# Patient Record
Sex: MALE | Race: WHITE | NOT HISPANIC OR LATINO | URBAN - METROPOLITAN AREA
[De-identification: names, ages, dates, MRNs, and addresses within clinical notes are randomized per-mention and may not be internally consistent; named-entity substitution may affect disease eponyms.]

---

## 2022-02-23 ENCOUNTER — OFFICE VISIT (OUTPATIENT)
Dept: URBAN - METROPOLITAN AREA CLINIC 107 | Facility: CLINIC | Age: 42
End: 2022-02-23
Payer: COMMERCIAL

## 2022-02-23 ENCOUNTER — WEB ENCOUNTER (OUTPATIENT)
Dept: URBAN - METROPOLITAN AREA CLINIC 107 | Facility: CLINIC | Age: 42
End: 2022-02-23

## 2022-02-23 VITALS
BODY MASS INDEX: 27.83 KG/M2 | RESPIRATION RATE: 18 BRPM | HEART RATE: 78 BPM | SYSTOLIC BLOOD PRESSURE: 125 MMHG | WEIGHT: 210 LBS | HEIGHT: 73 IN | TEMPERATURE: 87.6 F | DIASTOLIC BLOOD PRESSURE: 79 MMHG

## 2022-02-23 DIAGNOSIS — R10.13 EPIGASTRIC ABDOMINAL PAIN: ICD-10-CM

## 2022-02-23 DIAGNOSIS — R13.14 PHARYNGOESOPHAGEAL DYSPHAGIA: ICD-10-CM

## 2022-02-23 PROCEDURE — 99204 OFFICE O/P NEW MOD 45 MIN: CPT | Performed by: NURSE PRACTITIONER

## 2022-02-23 RX ORDER — ATORVASTATIN CALCIUM 10 MG/1
1 TABLET TABLET, FILM COATED ORAL ONCE A DAY
Status: ACTIVE | COMMUNITY

## 2022-02-23 RX ORDER — UBIDECARENONE 100 MG
AS DIRECTED CAPSULE ORAL
Status: ACTIVE | COMMUNITY

## 2022-02-23 RX ORDER — PANTOPRAZOLE SODIUM 40 MG/1
1 TABLET TABLET, DELAYED RELEASE ORAL ONCE A DAY
Status: ACTIVE | COMMUNITY

## 2022-02-23 NOTE — HPI-TODAY'S VISIT:
41-year-old male presenting for evaluation of dysphagia. Abdominal ultrasound 2/23/2021:Suspected anteriorly angulated/directed xiphoid process; noncontrast CT recommended for confirmation.  Otherwise no acute process. Within the last 18 months, he has experienced an intermittent knot-like discomfort in the epigastrium.  He had an ultrasound per his PCP which was fairly unremarkable, outlined below.  He saw another gastroenterologist and was started on Protonix and plan for an upper endoscopy, but he never scheduled the procedure.  His symptoms have persisted.  He states he is often awakening with dull epigastric pain and bloating which has been worsening over the last few weeks, following dietary indiscretion while watching the Super Bowl.  The symptoms are worsened following meals.  He denies heartburn or regurgitation on pantoprazole, but is only taking the medication intermittently.  He complains of a dry mouth and esophagus which leads to significant coughing.  This will subside with water or dry candy.  For several years, he has experienced intermittent difficulty swallowing.  He indicates certain foods such as meat or bread will become hung between the mid chest and level of the xiphoid process.  This is painful and can be passed with a sip of water or raising his arms above his head.

## 2022-03-21 ENCOUNTER — CLAIMS CREATED FROM THE CLAIM WINDOW (OUTPATIENT)
Dept: URBAN - METROPOLITAN AREA SURGERY CENTER 25 | Facility: SURGERY CENTER | Age: 42
End: 2022-03-21
Payer: COMMERCIAL

## 2022-03-21 ENCOUNTER — CLAIMS CREATED FROM THE CLAIM WINDOW (OUTPATIENT)
Dept: URBAN - METROPOLITAN AREA CLINIC 4 | Facility: CLINIC | Age: 42
End: 2022-03-21
Payer: COMMERCIAL

## 2022-03-21 ENCOUNTER — OFFICE VISIT (OUTPATIENT)
Dept: URBAN - METROPOLITAN AREA SURGERY CENTER 25 | Facility: SURGERY CENTER | Age: 42
End: 2022-03-21

## 2022-03-21 DIAGNOSIS — K21.9 GASTROESOPHAGEAL REFLUX DISEASE: ICD-10-CM

## 2022-03-21 DIAGNOSIS — K31.89 REACTIVE GASTROPATHY: ICD-10-CM

## 2022-03-21 DIAGNOSIS — K22.2 ESOPHAGEAL STENOSIS: ICD-10-CM

## 2022-03-21 DIAGNOSIS — K20.0 EOSINOPHILIC ESOPHAGITIS: ICD-10-CM

## 2022-03-21 DIAGNOSIS — K29.70 GASTRITIS, UNSPECIFIED, WITHOUT BLEEDING: ICD-10-CM

## 2022-03-21 PROCEDURE — G8907 PT DOC NO EVENTS ON DISCHARG: HCPCS | Performed by: INTERNAL MEDICINE

## 2022-03-21 PROCEDURE — 88312 SPECIAL STAINS GROUP 1: CPT | Performed by: PATHOLOGY

## 2022-03-21 PROCEDURE — 43248 EGD GUIDE WIRE INSERTION: CPT | Performed by: INTERNAL MEDICINE

## 2022-03-21 PROCEDURE — 43239 EGD BIOPSY SINGLE/MULTIPLE: CPT | Performed by: INTERNAL MEDICINE

## 2022-03-21 PROCEDURE — 88305 TISSUE EXAM BY PATHOLOGIST: CPT | Performed by: PATHOLOGY

## 2022-03-21 RX ORDER — PANTOPRAZOLE SODIUM 40 MG/1
1 TABLET TABLET, DELAYED RELEASE ORAL ONCE A DAY
Status: ACTIVE | COMMUNITY

## 2022-03-21 RX ORDER — ATORVASTATIN CALCIUM 10 MG/1
1 TABLET TABLET, FILM COATED ORAL ONCE A DAY
Status: ACTIVE | COMMUNITY

## 2022-03-21 RX ORDER — UBIDECARENONE 100 MG
AS DIRECTED CAPSULE ORAL
Status: ACTIVE | COMMUNITY

## 2022-04-21 ENCOUNTER — OFFICE VISIT (OUTPATIENT)
Dept: URBAN - METROPOLITAN AREA CLINIC 107 | Facility: CLINIC | Age: 42
End: 2022-04-21
Payer: COMMERCIAL

## 2022-04-21 VITALS
HEIGHT: 73 IN | SYSTOLIC BLOOD PRESSURE: 103 MMHG | DIASTOLIC BLOOD PRESSURE: 78 MMHG | TEMPERATURE: 98.2 F | HEART RATE: 83 BPM | BODY MASS INDEX: 28.1 KG/M2 | RESPIRATION RATE: 18 BRPM | WEIGHT: 212 LBS

## 2022-04-21 DIAGNOSIS — R10.13 EPIGASTRIC ABDOMINAL PAIN: ICD-10-CM

## 2022-04-21 DIAGNOSIS — R13.14 PHARYNGOESOPHAGEAL DYSPHAGIA: ICD-10-CM

## 2022-04-21 DIAGNOSIS — K20.0 EOSINOPHILIC ESOPHAGITIS: ICD-10-CM

## 2022-04-21 PROCEDURE — 99214 OFFICE O/P EST MOD 30 MIN: CPT | Performed by: PHYSICIAN ASSISTANT

## 2022-04-21 RX ORDER — UBIDECARENONE 100 MG
AS DIRECTED CAPSULE ORAL
Status: ACTIVE | COMMUNITY

## 2022-04-21 RX ORDER — PANTOPRAZOLE SODIUM 40 MG/1
1 TABLET TABLET, DELAYED RELEASE ORAL TWICE A DAY
Qty: 180 TABLET | Refills: 4 | OUTPATIENT
Start: 2022-05-04

## 2022-04-21 RX ORDER — PANTOPRAZOLE SODIUM 40 MG/1
1 TABLET TABLET, DELAYED RELEASE ORAL ONCE A DAY
Status: ACTIVE | COMMUNITY

## 2022-04-21 RX ORDER — ATORVASTATIN CALCIUM 10 MG/1
1 TABLET TABLET, FILM COATED ORAL ONCE A DAY
Status: ACTIVE | COMMUNITY

## 2022-04-21 NOTE — HPI-TODAY'S VISIT:
Mr. Chan is a 41-year-old gentleman with a history of hyperlipidemia, presenting for follow-up after undergoing EGD. He was last seen on 2/23/2022 for intermittent epigastric abdominal pain, GERD and progressively worsening solid food dysphagia.  He was to resume pantoprazole daily.  An EGD was planned at that time. EGD (3/21/2022): Z-line irregular with possible tongue of Broussard's esophagus.  Benign-appearing esophageal stenosis.  Dilated with 17 mm Savary dilator.  3 cm hiatal hernia.  Free-flow gastroesophageal reflux visualized during procedure.  Nonerosive gastropathy.  Normal duodenum.  Pathology revealed changes consistent with chemical/reactive gastropathy on stomach antrum and body biopsies.  GE junction biopsy revealed squamous columnar mucosa with markedly increased intraepithelial eosinophils (80 per hpf), arising in a background of reflux type changes.  No evidence of infectious organisms or Broussard's esophagus.  Negative for dysplasia or malignancy   He reports improvements in his swallowing after undergoing EGD.  Unfortunately, he continues to report complaints of epigastric abdominal discomfort.  He describes a "knot" in his stomach that "becomes more pronounced with food".  He is compliant with pantoprazole 40 mg every morning with minimal improvement in his symptoms.  He also has undergone dietary modifications with slight improvements.  He denies any difficulty swallowing or regurgitation.  No nausea or vomiting.  He apparently had an abdominal ultrasound performed 2 to 4 months ago.  Unfortunately do not have this result for review.  He denies any unintentional weight loss, fevers or chills. He denies any changes in his bowel habits.  No red blood per rectum, melena, hematochezia, constipation or diarrhea.

## 2022-05-04 PROBLEM — 40739000 DYSPHAGIA: Status: ACTIVE | Noted: 2022-02-23

## 2022-07-27 ENCOUNTER — OFFICE VISIT (OUTPATIENT)
Dept: URBAN - METROPOLITAN AREA CLINIC 107 | Facility: CLINIC | Age: 42
End: 2022-07-27
Payer: COMMERCIAL

## 2022-07-27 ENCOUNTER — DASHBOARD ENCOUNTERS (OUTPATIENT)
Age: 42
End: 2022-07-27

## 2022-07-27 VITALS
DIASTOLIC BLOOD PRESSURE: 76 MMHG | HEIGHT: 73 IN | WEIGHT: 209.2 LBS | HEART RATE: 69 BPM | BODY MASS INDEX: 27.73 KG/M2 | TEMPERATURE: 98.3 F | SYSTOLIC BLOOD PRESSURE: 127 MMHG

## 2022-07-27 DIAGNOSIS — K20.0 EOSINOPHILIC ESOPHAGITIS: ICD-10-CM

## 2022-07-27 DIAGNOSIS — R10.13 EPIGASTRIC ABDOMINAL PAIN: ICD-10-CM

## 2022-07-27 DIAGNOSIS — K21.00 GASTROESOPHAGEAL REFLUX DISEASE WITH ESOPHAGITIS WITHOUT HEMORRHAGE: ICD-10-CM

## 2022-07-27 PROCEDURE — 99213 OFFICE O/P EST LOW 20 MIN: CPT | Performed by: INTERNAL MEDICINE

## 2022-07-27 RX ORDER — UBIDECARENONE 100 MG
AS DIRECTED CAPSULE ORAL
Status: ACTIVE | COMMUNITY

## 2022-07-27 RX ORDER — PANTOPRAZOLE SODIUM 40 MG/1
1 TABLET TABLET, DELAYED RELEASE ORAL ONCE A DAY
OUTPATIENT

## 2022-07-27 RX ORDER — ATORVASTATIN CALCIUM 10 MG/1
1 TABLET TABLET, FILM COATED ORAL ONCE A DAY
Status: ACTIVE | COMMUNITY

## 2022-07-27 RX ORDER — PANTOPRAZOLE SODIUM 40 MG/1
1 TABLET TABLET, DELAYED RELEASE ORAL ONCE A DAY
Status: ACTIVE | COMMUNITY

## 2022-07-27 NOTE — HPI-TODAY'S VISIT:
Mr. Chan is a 41-year-old gentleman with GERD and dysphagia presenting for follow up.  He was last seen on 4/21/2022 for follow-up after undergoing an upper endoscopy for the evaluation of GERD, epigastric abdominal pain and esophageal dysphagia.  Biopsies of the esophagus were notable for eosinophilic esophagitis.  He was recommended to take pantoprazole 40 mg daily.  He returns for follow-up reporting that he is overall doing well.  He occasionally experiences a "knot" in his epigastrium that waxes and wanes, has no modifying factors including meals.  He denies any dysphagia.  He also denies any nausea, vomiting or abdominal pain.  Bowel habits are regular normal consistency.  No melena or red blood per rectum.

## 2022-07-27 NOTE — HPI-OTHER HISTORIES
EGD (3/21/2022): Z-line irregular with possible tongue of Broussard's esophagus.  Benign-appearing esophageal stenosis.  Dilated with 17 mm Savary dilator.  3 cm hiatal hernia.  Free-flow gastroesophageal reflux visualized during procedure.  Nonerosive gastropathy.  Normal duodenum.  Pathology revealed changes consistent with chemical/reactive gastropathy on stomach antrum and body biopsies.  GE junction biopsy revealed squamous columnar mucosa with markedly increased intraepithelial eosinophils (80 per hpf), arising in a background of reflux type changes.  No evidence of infectious organisms or Broussard's esophagus.  Negative for dysplasia or malignancy

## 2022-08-11 PROBLEM — 79922009 EPIGASTRIC PAIN: Status: ACTIVE | Noted: 2022-05-04

## 2022-08-11 PROBLEM — 266433003: Status: ACTIVE | Noted: 2022-07-27

## 2022-08-11 PROBLEM — 235599003: Status: ACTIVE | Noted: 2022-05-04

## 2025-07-30 ENCOUNTER — LAB OUTSIDE AN ENCOUNTER (OUTPATIENT)
Dept: URBAN - METROPOLITAN AREA CLINIC 113 | Facility: CLINIC | Age: 45
End: 2025-07-30

## 2025-07-30 ENCOUNTER — TELEPHONE ENCOUNTER (OUTPATIENT)
Dept: URBAN - METROPOLITAN AREA CLINIC 113 | Facility: CLINIC | Age: 45
End: 2025-07-30

## 2025-07-30 ENCOUNTER — OFFICE VISIT (OUTPATIENT)
Dept: URBAN - METROPOLITAN AREA CLINIC 113 | Facility: CLINIC | Age: 45
End: 2025-07-30

## 2025-07-30 RX ORDER — UBIDECARENONE 100 MG
AS DIRECTED CAPSULE ORAL
Status: ACTIVE | COMMUNITY

## 2025-07-30 RX ORDER — ATORVASTATIN CALCIUM 10 MG/1
1 TABLET TABLET, FILM COATED ORAL ONCE A DAY
Status: ACTIVE | COMMUNITY

## 2025-07-30 RX ORDER — SODIUM, POTASSIUM,MAG SULFATES 17.5-3.13G
354 ML SOLUTION, RECONSTITUTED, ORAL ORAL DAILY
Qty: 354 ML | Refills: 0 | OUTPATIENT
Start: 2025-07-31 | End: 2025-08-01

## 2025-07-30 RX ORDER — PANTOPRAZOLE SODIUM 20 MG/1
1 TABLET TABLET, DELAYED RELEASE ORAL ONCE A DAY
Status: ACTIVE | COMMUNITY

## 2025-08-02 ENCOUNTER — WEB ENCOUNTER (OUTPATIENT)
Age: 45
End: 2025-08-02

## 2025-08-02 RX ORDER — SODIUM, POTASSIUM,MAG SULFATES 17.5-3.13G
177 ML SOLUTION, RECONSTITUTED, ORAL ORAL AS DIRECTED
Qty: 354 MILLILITER | Refills: 0 | OUTPATIENT
Start: 2025-08-02

## 2025-08-02 RX ORDER — PANTOPRAZOLE SODIUM 40 MG/1
1 TABLET 1/2 TO 1 HOUR BEFORE MORNING MEAL TABLET, DELAYED RELEASE ORAL DAILY
Qty: 30 | Refills: 2

## 2025-08-04 ENCOUNTER — TELEPHONE ENCOUNTER (OUTPATIENT)
Dept: URBAN - METROPOLITAN AREA SURGERY CENTER 25 | Facility: SURGERY CENTER | Age: 45
End: 2025-08-04

## 2025-08-06 ENCOUNTER — OFFICE VISIT (OUTPATIENT)
Dept: URBAN - METROPOLITAN AREA SURGERY CENTER 25 | Facility: SURGERY CENTER | Age: 45
End: 2025-08-06
Payer: COMMERCIAL

## 2025-08-06 ENCOUNTER — CLAIMS CREATED FROM THE CLAIM WINDOW (OUTPATIENT)
Dept: URBAN - METROPOLITAN AREA CLINIC 4 | Facility: CLINIC | Age: 45
End: 2025-08-06
Payer: COMMERCIAL

## 2025-08-06 DIAGNOSIS — K51.218 CHRONIC ULCERATIVE PROCTITIS WITH OTHER COMPLICATION: ICD-10-CM

## 2025-08-06 DIAGNOSIS — K62.89 OTHER SPECIFIED DISEASES OF ANUS AND RECTUM: ICD-10-CM

## 2025-08-06 DIAGNOSIS — R19.5 OCCULT BLOOD POSITIVE STOOL: ICD-10-CM

## 2025-08-06 DIAGNOSIS — Z12.11 COLON CANCER SCREENING: ICD-10-CM

## 2025-08-06 DIAGNOSIS — K51.919 ULCERATIVE COLITIS, UNSPECIFIED WITH UNSPECIFIED COMPLICATIONS: ICD-10-CM

## 2025-08-06 DIAGNOSIS — K63.89 OTHER SPECIFIED DISEASES OF INTESTINE: ICD-10-CM

## 2025-08-06 PROCEDURE — 45380 COLONOSCOPY AND BIOPSY: CPT | Performed by: INTERNAL MEDICINE

## 2025-08-06 PROCEDURE — 00812 ANES LWR INTST SCR COLSC: CPT | Performed by: NURSE ANESTHETIST, CERTIFIED REGISTERED

## 2025-08-06 PROCEDURE — 88305 TISSUE EXAM BY PATHOLOGIST: CPT | Performed by: PATHOLOGY

## 2025-08-06 RX ORDER — ATORVASTATIN CALCIUM 10 MG/1
1 TABLET TABLET, FILM COATED ORAL ONCE A DAY
Status: ACTIVE | COMMUNITY

## 2025-08-06 RX ORDER — PANTOPRAZOLE SODIUM 40 MG/1
1 TABLET 1/2 TO 1 HOUR BEFORE MORNING MEAL TABLET, DELAYED RELEASE ORAL DAILY
Qty: 30 | Refills: 2 | Status: ACTIVE | COMMUNITY

## 2025-08-06 RX ORDER — SODIUM, POTASSIUM,MAG SULFATES 17.5-3.13G
177 ML SOLUTION, RECONSTITUTED, ORAL ORAL AS DIRECTED
Qty: 354 MILLILITER | Refills: 0 | Status: ACTIVE | COMMUNITY
Start: 2025-08-02

## 2025-08-06 RX ORDER — UBIDECARENONE 100 MG
AS DIRECTED CAPSULE ORAL
Status: ACTIVE | COMMUNITY

## 2025-08-08 ENCOUNTER — TELEPHONE ENCOUNTER (OUTPATIENT)
Dept: URBAN - METROPOLITAN AREA CLINIC 113 | Facility: CLINIC | Age: 45
End: 2025-08-08

## 2025-08-12 ENCOUNTER — TELEPHONE ENCOUNTER (OUTPATIENT)
Dept: URBAN - METROPOLITAN AREA CLINIC 107 | Facility: CLINIC | Age: 45
End: 2025-08-12

## 2025-08-18 ENCOUNTER — CLAIMS CREATED FROM THE CLAIM WINDOW (OUTPATIENT)
Dept: URBAN - METROPOLITAN AREA SURGERY CENTER 25 | Facility: SURGERY CENTER | Age: 45
End: 2025-08-18
Payer: COMMERCIAL

## 2025-08-18 ENCOUNTER — OFFICE VISIT (OUTPATIENT)
Dept: URBAN - METROPOLITAN AREA SURGERY CENTER 25 | Facility: SURGERY CENTER | Age: 45
End: 2025-08-18

## 2025-08-18 DIAGNOSIS — K21.9 GASTRO-ESOPHAGEAL REFLUX DISEASE WITHOUT ESOPHAGITIS: ICD-10-CM

## 2025-08-18 DIAGNOSIS — K22.89 OTHER SPECIFIED DISEASE OF ESOPHAGUS: ICD-10-CM

## 2025-08-18 DIAGNOSIS — K44.9 HIATAL HERNIA: ICD-10-CM

## 2025-08-18 DIAGNOSIS — K51.211 ULCERATIVE PROCTITIS WITH RECTAL BLEEDING: ICD-10-CM

## 2025-08-18 DIAGNOSIS — K31.89 OTHER DISEASES OF STOMACH AND DUODENUM: ICD-10-CM

## 2025-08-18 PROCEDURE — 00731 ANES UPR GI NDSC PX NOS: CPT | Performed by: ANESTHESIOLOGY

## 2025-08-18 RX ORDER — UBIDECARENONE 100 MG
AS DIRECTED CAPSULE ORAL
Status: ACTIVE | COMMUNITY

## 2025-08-18 RX ORDER — MESALAMINE 1000 MG/1
1 SUPPOSITORY AT BEDTIME SUPPOSITORY RECTAL ONCE A DAY
Qty: 30 | Refills: 3 | OUTPATIENT
Start: 2025-08-18

## 2025-08-18 RX ORDER — SODIUM, POTASSIUM,MAG SULFATES 17.5-3.13G
177 ML SOLUTION, RECONSTITUTED, ORAL ORAL AS DIRECTED
Qty: 354 MILLILITER | Refills: 0 | Status: ACTIVE | COMMUNITY
Start: 2025-08-02

## 2025-08-18 RX ORDER — ATORVASTATIN CALCIUM 10 MG/1
1 TABLET TABLET, FILM COATED ORAL ONCE A DAY
Status: ACTIVE | COMMUNITY

## 2025-08-18 RX ORDER — PANTOPRAZOLE SODIUM 40 MG/1
1 TABLET 1/2 TO 1 HOUR BEFORE MORNING MEAL TABLET, DELAYED RELEASE ORAL DAILY
Qty: 30 | Refills: 2 | Status: ACTIVE | COMMUNITY